# Patient Record
Sex: MALE | Race: WHITE | NOT HISPANIC OR LATINO
[De-identification: names, ages, dates, MRNs, and addresses within clinical notes are randomized per-mention and may not be internally consistent; named-entity substitution may affect disease eponyms.]

---
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
## 2024-10-31 NOTE — ASU PREOP CHECKLIST - HEIGHT IN CM
As per H&P; Patient is a 87F, with PMHx of Polycythemia vera(JAK2), HTN, HLD, DM, Dementia, Hypothyroidism, who comes in due to a mechanical fall resulting in right shoulder pain. Per daughter, she was bringing the patient for dental appointment. Patient was left on the top of staircase when she went to put the walker in the car. Patient took a step down and fell forwards down 3-4 steps. She was holding onto railing with her right arm. Patient fell forward but there was no LOC or head stroke. Patient had severe right RUE pain. Patient denies headache, fever, chills, nausea, vomit, chest pain, shortness of breath, cough, lightheadedness, abdominal pain, diarrhea, constipation, dysuria. Yesterday, per daughter, the patient complained of palpitations. 165.1

## (undated) DEVICE — POLISHER OR CAUTERY TIP

## (undated) DEVICE — NDL SPINAL 22G X 3.5" (BLACK)

## (undated) DEVICE — DRSG MASTISOL

## (undated) DEVICE — Device

## (undated) DEVICE — SYR LUER LOK 10CC

## (undated) DEVICE — DRAPE MAGNETIC INSTRUMENT MEDIUM

## (undated) DEVICE — DRSG STERISTRIPS 0.5 X 4"

## (undated) DEVICE — SUT PLAIN GUT 4-0 18" PS-2

## (undated) DEVICE — BLOCK SILICON

## (undated) DEVICE — BLADE SURGICAL #11 CARBON

## (undated) DEVICE — BIPOLAR FORCEP SYMMETRY BAYONET STR 8.25" X 1.5MM (BLUE)

## (undated) DEVICE — SYR LUER LOK 5CC

## (undated) DEVICE — DRSG AQUAPLAST BLANK BLUSH 3 X 3"

## (undated) DEVICE — VAGINAL PACKING 2"

## (undated) DEVICE — DRAPE TOWEL BLUE 17" X 24"

## (undated) DEVICE — BLADE SURGICAL #15 CARBON

## (undated) DEVICE — NDL HYPO SAFE 25G X 1.5" (ORANGE)

## (undated) DEVICE — NDL 18G BLUNT FILL PINK

## (undated) DEVICE — APPLICATOR Q TIP 6" WOOD STEM

## (undated) DEVICE — BLADE SURGICAL #10 CARBON

## (undated) DEVICE — MARKING PEN W RULER

## (undated) DEVICE — SUT SILK 2-0 30" PSL

## (undated) DEVICE — FOLEY TRAY 16FR 5CC LF UMETER CLOSED

## (undated) DEVICE — PACK UPPER BODY

## (undated) DEVICE — ELCTR COLORADO 3CM